# Patient Record
Sex: FEMALE | Race: WHITE | NOT HISPANIC OR LATINO | Employment: UNEMPLOYED | URBAN - METROPOLITAN AREA
[De-identification: names, ages, dates, MRNs, and addresses within clinical notes are randomized per-mention and may not be internally consistent; named-entity substitution may affect disease eponyms.]

---

## 2022-10-11 LAB
CHOLESTEROL (EXTERNAL): 167 MG/DL (ref 130–200)
HDLC SERPL-MCNC: 55 MG/DL
HEP C HIM: NORMAL
HIV 1&2 EXT: NORMAL
LDL CHOLESTEROL CALCULATED (EXTERNAL): 93.6 MG/DL
TRIGLYCERIDES (EXTERNAL): 92 MG/DL (ref 30–150)

## 2022-11-27 ENCOUNTER — TRANSFERRED RECORDS (OUTPATIENT)
Dept: MULTI SPECIALTY CLINIC | Facility: CLINIC | Age: 52
End: 2022-11-27

## 2023-09-29 ENCOUNTER — TRANSFERRED RECORDS (OUTPATIENT)
Dept: HEALTH INFORMATION MANAGEMENT | Facility: CLINIC | Age: 53
End: 2023-09-29

## 2023-10-22 ENCOUNTER — HEALTH MAINTENANCE LETTER (OUTPATIENT)
Age: 53
End: 2023-10-22

## 2023-11-10 ENCOUNTER — OFFICE VISIT (OUTPATIENT)
Dept: FAMILY MEDICINE | Facility: CLINIC | Age: 53
End: 2023-11-10
Payer: COMMERCIAL

## 2023-11-10 VITALS
RESPIRATION RATE: 16 BRPM | HEART RATE: 84 BPM | BODY MASS INDEX: 35.61 KG/M2 | HEIGHT: 63 IN | WEIGHT: 201 LBS | OXYGEN SATURATION: 98 % | TEMPERATURE: 97.6 F | SYSTOLIC BLOOD PRESSURE: 112 MMHG | DIASTOLIC BLOOD PRESSURE: 77 MMHG

## 2023-11-10 DIAGNOSIS — E66.812 CLASS 2 OBESITY WITHOUT SERIOUS COMORBIDITY WITH BODY MASS INDEX (BMI) OF 36.0 TO 36.9 IN ADULT, UNSPECIFIED OBESITY TYPE: ICD-10-CM

## 2023-11-10 DIAGNOSIS — G47.00 INSOMNIA, UNSPECIFIED TYPE: ICD-10-CM

## 2023-11-10 DIAGNOSIS — R19.7 DIARRHEA, UNSPECIFIED TYPE: ICD-10-CM

## 2023-11-10 DIAGNOSIS — E78.2 MIXED HYPERLIPIDEMIA: ICD-10-CM

## 2023-11-10 DIAGNOSIS — Z79.899 MEDICATION MANAGEMENT: ICD-10-CM

## 2023-11-10 DIAGNOSIS — R06.83 SNORING: ICD-10-CM

## 2023-11-10 DIAGNOSIS — Z00.00 ENCOUNTER FOR ROUTINE HISTORY AND PHYSICAL EXAM IN FEMALE: Primary | ICD-10-CM

## 2023-11-10 DIAGNOSIS — Z13.1 DIABETES MELLITUS SCREENING: ICD-10-CM

## 2023-11-10 DIAGNOSIS — Z12.31 VISIT FOR SCREENING MAMMOGRAM: ICD-10-CM

## 2023-11-10 PROBLEM — R73.03 PREDIABETES: Status: ACTIVE | Noted: 2023-11-10

## 2023-11-10 LAB
ALBUMIN SERPL BCG-MCNC: 4.1 G/DL (ref 3.5–5.2)
ALBUMIN UR-MCNC: NEGATIVE MG/DL
ALP SERPL-CCNC: 65 U/L (ref 35–104)
ALT SERPL W P-5'-P-CCNC: 32 U/L (ref 0–50)
ANION GAP SERPL CALCULATED.3IONS-SCNC: 13 MMOL/L (ref 7–15)
APPEARANCE UR: CLEAR
AST SERPL W P-5'-P-CCNC: 29 U/L (ref 0–45)
BILIRUB SERPL-MCNC: 0.3 MG/DL
BILIRUB UR QL STRIP: NEGATIVE
BUN SERPL-MCNC: 11.9 MG/DL (ref 6–20)
CALCIUM SERPL-MCNC: 9.8 MG/DL (ref 8.6–10)
CHLORIDE SERPL-SCNC: 104 MMOL/L (ref 98–107)
CHOLEST SERPL-MCNC: 171 MG/DL
COLOR UR AUTO: YELLOW
CREAT SERPL-MCNC: 0.97 MG/DL (ref 0.51–0.95)
DEPRECATED HCO3 PLAS-SCNC: 23 MMOL/L (ref 22–29)
EGFRCR SERPLBLD CKD-EPI 2021: 69 ML/MIN/1.73M2
ERYTHROCYTE [DISTWIDTH] IN BLOOD BY AUTOMATED COUNT: 13.3 % (ref 10–15)
GLUCOSE SERPL-MCNC: 127 MG/DL (ref 70–99)
GLUCOSE UR STRIP-MCNC: NEGATIVE MG/DL
HBA1C MFR BLD: 5.9 % (ref 0–5.6)
HCT VFR BLD AUTO: 41.5 % (ref 35–47)
HDLC SERPL-MCNC: 54 MG/DL
HGB BLD-MCNC: 13.8 G/DL (ref 11.7–15.7)
HGB UR QL STRIP: NEGATIVE
KETONES UR STRIP-MCNC: NEGATIVE MG/DL
LDLC SERPL CALC-MCNC: 93 MG/DL
LEUKOCYTE ESTERASE UR QL STRIP: NEGATIVE
MCH RBC QN AUTO: 29.4 PG (ref 26.5–33)
MCHC RBC AUTO-ENTMCNC: 33.3 G/DL (ref 31.5–36.5)
MCV RBC AUTO: 89 FL (ref 78–100)
NITRATE UR QL: NEGATIVE
NONHDLC SERPL-MCNC: 117 MG/DL
PH UR STRIP: 5.5 [PH] (ref 5–8)
PLATELET # BLD AUTO: 315 10E3/UL (ref 150–450)
POTASSIUM SERPL-SCNC: 4.1 MMOL/L (ref 3.4–5.3)
PROT SERPL-MCNC: 7 G/DL (ref 6.4–8.3)
RBC # BLD AUTO: 4.69 10E6/UL (ref 3.8–5.2)
SODIUM SERPL-SCNC: 140 MMOL/L (ref 135–145)
SP GR UR STRIP: 1.02 (ref 1–1.03)
TRIGL SERPL-MCNC: 120 MG/DL
TSH SERPL DL<=0.005 MIU/L-ACNC: 4.39 UIU/ML (ref 0.3–4.2)
UROBILINOGEN UR STRIP-ACNC: 0.2 E.U./DL
WBC # BLD AUTO: 8.8 10E3/UL (ref 4–11)

## 2023-11-10 PROCEDURE — 83036 HEMOGLOBIN GLYCOSYLATED A1C: CPT | Performed by: NURSE PRACTITIONER

## 2023-11-10 PROCEDURE — 84443 ASSAY THYROID STIM HORMONE: CPT | Performed by: NURSE PRACTITIONER

## 2023-11-10 PROCEDURE — 99213 OFFICE O/P EST LOW 20 MIN: CPT | Mod: 25 | Performed by: NURSE PRACTITIONER

## 2023-11-10 PROCEDURE — 80053 COMPREHEN METABOLIC PANEL: CPT | Performed by: NURSE PRACTITIONER

## 2023-11-10 PROCEDURE — 80061 LIPID PANEL: CPT | Performed by: NURSE PRACTITIONER

## 2023-11-10 PROCEDURE — 85027 COMPLETE CBC AUTOMATED: CPT | Performed by: NURSE PRACTITIONER

## 2023-11-10 PROCEDURE — 81003 URINALYSIS AUTO W/O SCOPE: CPT | Performed by: NURSE PRACTITIONER

## 2023-11-10 PROCEDURE — 99386 PREV VISIT NEW AGE 40-64: CPT | Performed by: NURSE PRACTITIONER

## 2023-11-10 PROCEDURE — 84439 ASSAY OF FREE THYROXINE: CPT | Performed by: NURSE PRACTITIONER

## 2023-11-10 PROCEDURE — 36415 COLL VENOUS BLD VENIPUNCTURE: CPT | Performed by: NURSE PRACTITIONER

## 2023-11-10 RX ORDER — UBIDECARENONE 100 MG
100 CAPSULE ORAL DAILY
COMMUNITY

## 2023-11-10 RX ORDER — AMOXICILLIN 500 MG
1200 CAPSULE ORAL DAILY
COMMUNITY

## 2023-11-10 RX ORDER — SIMVASTATIN 10 MG
10 TABLET ORAL AT BEDTIME
COMMUNITY
End: 2023-11-10

## 2023-11-10 RX ORDER — MULTIPLE VITAMINS W/ MINERALS TAB 9MG-400MCG
1 TAB ORAL DAILY
COMMUNITY

## 2023-11-10 RX ORDER — ZOLPIDEM TARTRATE 5 MG/1
5 TABLET ORAL
COMMUNITY
End: 2024-09-06

## 2023-11-10 RX ORDER — SIMVASTATIN 10 MG
10 TABLET ORAL AT BEDTIME
Qty: 90 TABLET | Refills: 3 | Status: SHIPPED | OUTPATIENT
Start: 2023-11-10 | End: 2024-09-06

## 2023-11-10 ASSESSMENT — ENCOUNTER SYMPTOMS
CHILLS: 0
JOINT SWELLING: 0
HEARTBURN: 1
FREQUENCY: 0
SHORTNESS OF BREATH: 0
SORE THROAT: 0
HEADACHES: 1
DIZZINESS: 1
ARTHRALGIAS: 0
NAUSEA: 0
HEMATURIA: 0
BREAST MASS: 0
CONSTIPATION: 0
DYSURIA: 0
PARESTHESIAS: 0
EYE PAIN: 0
PALPITATIONS: 0
NERVOUS/ANXIOUS: 0
ABDOMINAL PAIN: 0
WEAKNESS: 0
HEMATOCHEZIA: 0
MYALGIAS: 0
COUGH: 0
DIARRHEA: 1
FEVER: 0

## 2023-11-10 ASSESSMENT — PAIN SCALES - GENERAL: PAINLEVEL: NO PAIN (0)

## 2023-11-10 NOTE — PROGRESS NOTES
Assessment and Plan:    Encounter for routine history and physical exam in female  Recommend consuming a healthy diet and exercising.  She is due for mammogram.  She is up-to-date on colonoscopy and cervical cancer screening.  We will try to obtain results.  - CBC with platelets  - TSH with free T4 reflex  - UA Macroscopic with reflex to Microscopic and Culture  - CBC with platelets  - TSH with free T4 reflex  - UA Macroscopic with reflex to Microscopic and Culture    Visit for screening mammogram  - MA SCREENING DIGITAL BILAT - Future  (s+30)    Diabetes mellitus screening  - Hemoglobin A1c  - Hemoglobin A1c    Mixed hyperlipidemia  We will check lipid cascade.  She continues simvastatin.  Will obtain cardiac calcium score for risk stratification.  - Lipid panel reflex to direct LDL Non-fasting  - simvastatin (ZOCOR) 10 MG tablet  Dispense: 90 tablet; Refill: 3  - CT Coronary Calcium Scan  - Lipid panel reflex to direct LDL Non-fasting    Diarrhea, unspecified type  We will refer to gastroenterology for further evaluation to rule out fructose and lactose intolerance.  - Adult GI  Referral - Consult Only    Snoring  We will refer to sleep center.  - Adult Sleep Eval & Management  Referral    Insomnia, unspecified type  She continue zolpidem as needed.    Medication management  - Comprehensive metabolic panel  - Comprehensive metabolic panel    Class 2 obesity without serious comorbidity with body mass index (BMI) of 36.0 to 36.9 in adult, unspecified obesity type  Recommend consuming a healthy diet and exercising.  This is contributing to hyperlipidemia.      Subjective:     Jesusita is a 53 year old female presenting to the clinic for a female physical.     LMP: endometrial ablation 2009; no period   Hx of abnormal pap smear: none   Last pap smear: 2019   Perform self-breast exams: yes   Vaginal discharge or irritation: none   Sexually active:  to a male for 10 years   Contraception: none    Concerns for STDs: none   Previous pregnancies:none     Patient moved from [place], [place] for her 's job at .  She has lived here for 6 months.  Patient grew up in [place].  Patient takes Zolpidem as needed for insomnia.  She takes this when she is stressed and has difficulty staying asleep.  She started a sleep hygiene program.  Patient is taking simvastatin 10 mg daily for lipid management.  She does not consume healthy diet.  She walks her dog for exercise.  She plans on exercising via virtual reality.  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  She took oral steroids.  Symptoms improved.  She has been asymptomatic since.  She has a history of daily diarrhea for multiple years.  Patient denies abdominal pain or discomfort.  She has not had any blood or mucus in stool.  She had a colonoscopy last year showing polyps.  Next colonoscopy is in 5 years.  Patient has been snoring and would like a sleep evaluation.    Review of systems:  I performed a 10 point review of systems.  All pertinent positives and negatives are noted in the HPI. All others are negative.     Allergies   Allergen Reactions    Sulfa Antibiotics Hives       Current Outpatient Medications   Medication    co-enzyme Q-10 100 MG CAPS capsule    multivitamin w/minerals (MULTI-VITAMIN) tablet    Omega-3 Fatty Acids (FISH OIL) 1200 MG capsule    simvastatin (ZOCOR) 10 MG tablet     No current facility-administered medications for this visit.       Social History     Socioeconomic History    Marital status:      Spouse name: Not on file    Number of children: Not on file    Years of education: Not on file    Highest education level: Not on file   Occupational History    Not on file   Tobacco Use    Smoking status: Former     Types: Cigarettes     Passive exposure: Past    Smokeless tobacco: Never   Vaping Use    Vaping Use: Never used   Substance and Sexual Activity    Alcohol use: Not on file     "Drug use: Not on file    Sexual activity: Not on file   Other Topics Concern    Not on file   Social History Narrative    Not on file     Social Determinants of Health     Financial Resource Strain: Low Risk  (11/10/2023)    Financial Resource Strain     Within the past 12 months, have you or your family members you live with been unable to get utilities (heat, electricity) when it was really needed?: No   Food Insecurity: Low Risk  (11/10/2023)    Food Insecurity     Within the past 12 months, did you worry that your food would run out before you got money to buy more?: No     Within the past 12 months, did the food you bought just not last and you didn t have money to get more?: No   Transportation Needs: Low Risk  (11/10/2023)    Transportation Needs     Within the past 12 months, has lack of transportation kept you from medical appointments, getting your medicines, non-medical meetings or appointments, work, or from getting things that you need?: No   Physical Activity: Not on file   Stress: Not on file   Social Connections: Not on file   Interpersonal Safety: Low Risk  (11/10/2023)    Interpersonal Safety     Do you feel physically and emotionally safe where you currently live?: Yes     Within the past 12 months, have you been hit, slapped, kicked or otherwise physically hurt by someone?: No     Within the past 12 months, have you been humiliated or emotionally abused in other ways by your partner or ex-partner?: No   Housing Stability: Low Risk  (11/10/2023)    Housing Stability     Do you have housing? : Yes     Are you worried about losing your housing?: No       No past medical history on file.    No family history on file.    No past surgical history on file.    Objective:     /77   Pulse 84   Temp 97.6  F (36.4  C)   Resp 16   Ht 1.588 m (5' 2.5\")   Wt 91.2 kg (201 lb)   SpO2 98%   Breastfeeding No   BMI 36.18 kg/m      Patient is alert, no obvious distress.   Skin: Warm, dry.  No rashes or " lesions. Skin turgor rapid return.   HEENT:  Eyes normal.  Ears normal.  Nose patent, mucosa pink.  Oropharynx mucosa pink, no lesions or tonsil enlargement.   Neck:  Supple, without lymphadenopathy, bruits, JVD. Thyroid normal texture and size.    Lungs:  Clear to auscultation.  No wheezing, rales noted.  Respirations even and unlabored.   Heart:  Regular rate and rhythm.  No murmurs.   Breasts:  Normal.  No surrounding adenopathy.   Abdomen: Soft, nontender.  No organomegaly.  Bowel sounds normoactive.  No guarding or masses noted.   :  deferred  Musculoskeletal:  Full ROM of extremities.  Muscle strength equal +5/5.   Neurological:  Cranial nerves 2-12 intact.            Answers submitted by the patient for this visit:  Annual Preventive Visit (Submitted on 11/10/2023)  Chief Complaint: Annual Exam:  Frequency of exercise:: None  Getting at least 3 servings of Calcium per day:: NO  Diet:: Regular (no restrictions)  Taking medications regularly:: Yes  Medication side effects:: None  Bi-annual eye exam:: Yes  Dental care twice a year:: Yes  Sleep apnea or symptoms of sleep apnea:: Excessive snoring  abdominal pain: No  Blood in stool: No  Blood in urine: No  chest pain: No  chills: No  congestion: No  constipation: No  cough: No  diarrhea: Yes  dizziness: Yes  ear pain: No  eye pain: No  nervous/anxious: No  fever: No  frequency: No  genital sores: No  headaches: Yes  hearing loss: No  heartburn: Yes  arthralgias: No  joint swelling: No  peripheral edema: No  mood changes: Yes  myalgias: No  nausea: No  dysuria: No  palpitations: No  Skin sensation changes: No  sore throat: No  urgency: No  rash: No  shortness of breath: No  visual disturbance: No  weakness: No  pelvic pain: No  vaginal bleeding: No  vaginal discharge: No  tenderness: No  breast mass: No  breast discharge: No  Additional concerns today:: Yes

## 2023-11-11 LAB — T4 FREE SERPL-MCNC: 1.13 NG/DL (ref 0.9–1.7)

## 2023-11-12 DIAGNOSIS — R79.89 ELEVATED TSH: Primary | ICD-10-CM

## 2023-11-26 ENCOUNTER — HOSPITAL ENCOUNTER (OUTPATIENT)
Dept: CT IMAGING | Facility: CLINIC | Age: 53
Discharge: HOME | End: 2023-11-26
Attending: NURSE PRACTITIONER | Admitting: NURSE PRACTITIONER
Payer: COMMERCIAL

## 2023-11-26 DIAGNOSIS — E78.2 MIXED HYPERLIPIDEMIA: ICD-10-CM

## 2023-11-26 PROCEDURE — 75571 CT HRT W/O DYE W/CA TEST: CPT | Mod: 26 | Performed by: INTERNAL MEDICINE

## 2023-11-26 PROCEDURE — 75571 CT HRT W/O DYE W/CA TEST: CPT

## 2023-11-27 LAB
CV CALCIUM SCORE AGATSTON LM: 0
CV CALCIUM SCORING AGATSON LAD: 0
CV CALCIUM SCORING AGATSTON CX: 0
CV CALCIUM SCORING AGATSTON RCA: 0
CV CALCIUM SCORING AGATSTON TOTAL: 0

## 2023-12-12 ENCOUNTER — HOSPITAL ENCOUNTER (OUTPATIENT)
Dept: MAMMOGRAPHY | Facility: CLINIC | Age: 53
Discharge: HOME | End: 2023-12-12
Attending: NURSE PRACTITIONER | Admitting: NURSE PRACTITIONER
Payer: COMMERCIAL

## 2023-12-12 DIAGNOSIS — Z12.31 VISIT FOR SCREENING MAMMOGRAM: ICD-10-CM

## 2023-12-12 PROCEDURE — 77067 SCR MAMMO BI INCL CAD: CPT

## 2024-03-29 ENCOUNTER — MYC REFILL (OUTPATIENT)
Dept: FAMILY MEDICINE | Facility: CLINIC | Age: 54
End: 2024-03-29
Payer: COMMERCIAL

## 2024-03-29 DIAGNOSIS — E78.2 MIXED HYPERLIPIDEMIA: ICD-10-CM

## 2024-04-01 RX ORDER — SIMVASTATIN 10 MG
10 TABLET ORAL AT BEDTIME
Qty: 90 TABLET | Refills: 3 | OUTPATIENT
Start: 2024-04-01

## 2024-04-01 ASSESSMENT — SLEEP AND FATIGUE QUESTIONNAIRES
HOW LIKELY ARE YOU TO NOD OFF OR FALL ASLEEP IN A CAR, WHILE STOPPED FOR A FEW MINUTES IN TRAFFIC: WOULD NEVER DOZE
HOW LIKELY ARE YOU TO NOD OFF OR FALL ASLEEP WHILE SITTING AND TALKING TO SOMEONE: WOULD NEVER DOZE
HOW LIKELY ARE YOU TO NOD OFF OR FALL ASLEEP WHILE SITTING AND READING: SLIGHT CHANCE OF DOZING
HOW LIKELY ARE YOU TO NOD OFF OR FALL ASLEEP WHEN YOU ARE A PASSENGER IN A CAR FOR AN HOUR WITHOUT A BREAK: WOULD NEVER DOZE
HOW LIKELY ARE YOU TO NOD OFF OR FALL ASLEEP WHILE LYING DOWN TO REST IN THE AFTERNOON WHEN CIRCUMSTANCES PERMIT: MODERATE CHANCE OF DOZING
HOW LIKELY ARE YOU TO NOD OFF OR FALL ASLEEP WHILE SITTING QUIETLY AFTER LUNCH WITHOUT ALCOHOL: WOULD NEVER DOZE
HOW LIKELY ARE YOU TO NOD OFF OR FALL ASLEEP WHILE WATCHING TV: SLIGHT CHANCE OF DOZING
HOW LIKELY ARE YOU TO NOD OFF OR FALL ASLEEP WHILE SITTING INACTIVE IN A PUBLIC PLACE: WOULD NEVER DOZE

## 2024-04-05 NOTE — PROGRESS NOTES
Virtual Visit Details    Type of service:  Video Visit   Video start time 8:02 AM  Video end time 8:36 AM  Originating Location (pt. Location): Home    Distant Location (provider location):  Off-site  Platform used for Video Visit: [facility]    Outpatient Sleep Medicine Consultation:    Name: Jesusita Henderson MRN# [id number]   Age: 53 year old YOB: 1970     Date of Consultation: April 5, 2024  Consultation is requested by: GLENDY Abreu CNP  [address]  [facility] [address]  [place] [address] Beronica Malhotra  Primary care provider: Beronica Malhotra       Reason for Sleep Consult:     Jesusita Henderson is sent by Beronica Malhotra for a sleep consultation regarding 1. Snoring    Patient s Reason for visit  Jesusita Henderson main reason for visit: Snoring  Patient states problem(s) started: Maybe 3 years ago  Jesusita Henderson's goals for this visit: Find a direction for a solution         Assessment and Plan:     1. Snoring  2. Obesity (BMI 30-39.9)  Patient presents to clinic today for evaluation of loud disruptive snoring.  No witnessed apneic events, gasping/choking arousals, or daytime sleepiness.  She wakes feeling refreshed from sleep.  Discussed with her loud snoring, obesity with BMI 35.25, and age over 50 she is at intermediate risk for underlying MADDY (STOPBANG 3/8). Unknown neck circumference given virtual visit.  Discussed pathophysiology of snoring and MADDY and discussed there are cardiovascular complications associated with sleep apnea.  We discussed the option of pursuing sleep study but patient would like to hold off at this time.  She is mostly concerned with the noise of her snoring and does not believe she has underlying sleep apnea, would prefer to try some interventions for snoring alone first before jumping to study which is reasonable approach.  We discussed avoidance of supine sleep (Sleep Noodle, etc), treating nasal inflammation with nasal steroid (such as Flonase), exercising/strengthening tongue  muscles with game (Soundly naty), avoiding alcohol before bedtime, long term sustained weight loss, and mandibular advancement device (OTC Snore Rx vs sleep dentistry made).  She is likely going to try a combination of the above and I asked her to keep me updated if she sees no improvement or if things worsen and we will likely pursue study at that time for further evaluation.  For now she will follow-up as needed/pending progress with the above.         History of Present Illness:     Jesusita Henderson is a 53-year-old female with obesity, HLD, prediabetes, insomnia on Ambien as needed who presents to clinic today for evaluation of snoring.  Patient and her  slept separately for many years but are now in the same bed again,  has a CPAP so his snoring is no longer an issue but he has to wear earplugs because Jesusita's snoring is so loud.   denies any gasping/choking/witnessed apneic events.  Jesusita does feel well rested on waking and denies any daytime sleepiness.  Has had standing prescription for Ambien since her father , uses very rarely estimates 3 nights in the past year she has needed it.  Will occasionally take an over-the-counter sleep aid, estimates 30 nights in the past year.    SLEEP-WAKE SCHEDULE:     Work/School Days: Patient goes to school/work: Yes   Usually gets into bed at 9pm  Takes patient about 20-30 mins to fall asleep  Has trouble falling asleep 2 nights per week  Wakes up in the middle of the night 1 time.  Wakes up due to Snorting self awake;External stimuli (bed partner, pets, noise, etc);Anxiety  She has trouble falling back asleep Usually any time i wake up I dont fall back asleep times a week.   It usually takes Na to get back to sleep  Patient is usually up at 4am  Uses alarm:      Weekends/Non-work Days/All Other Days:  Usually gets into bed at 10pm  Takes patient about 20-30 minutes to fall asleep  Patient is usually up at 4am  Uses alarm: No    Sleep Need  Patient  estimates she gets 6 sleep on average   Patient thinks she needs about 7.5 sleep    Jesusita Henderson prefers to sleep in this position(s): Side   Patient states they do the following activities in bed: Read;Watch TV    Naps  Patient takes a purposeful nap 0 times a week   She feels better after a nap: Yes  She dozes off unintentionally 0 days per week  Patient has had a driving accident or near-miss due to sleepiness/drowsiness: No  She had a total [place] Sleepiness Scale of 4, with scores of 10 or higher indicating abnormal levels of sleepiness.     SLEEP DISRUPTIONS:    Breathing/Snoring  Patient snores:Yes  Other people complain about her snoring: Yes  Patient has been told she stops breathing in her sleep:No  Gasping/choking arousals:No  She has issues with the following: Heartburn or reflux at night  Denies morning headaches, morning nasal congestion, morning dry mouth, frequent nocturia    Movement:  Patient gets pain, discomfort, with an urge to move:  Yes - occasional back pain, denies RLS symptoms  It happens when she is resting:  No  It happens more at night:  No  It improves with movement:No  Patient has been told she kicks her legs at night:  No     Behaviors in Sleep:  Denies sleepwalking, sleep talking, sleep eating, bruxism, dream enactment, recurring nightmares, night terrors, sleep paralysis, hypnagogic/hypnopompic hallucinations, cataplexy      CAFFEINE AND OTHER SUBSTANCES:    Patient consumes caffeinated beverages per day:  2-3 coffee or tea per day  Last caffeine use is usually: 9a  List of any prescribed or over the counter stimulants that patient takes: Na  List of any prescribed or over the counter sleep medication patient takes: I will take Advil PM or on occassiom Zolpiderm  Patient drinks alcohol to help them sleep: No  Patient drinks alcohol near bedtime: No    Family History:  Patient has a family member been diagnosed with a sleep disorder: Yes  Father had apnea     SCALES:    EPWORTH  SLEEPINESS SCALE         4/1/2024     5:35 PM    [place] Sleepiness Scale ( WARD Moreno  [id number]<br>ESS - [place]/English - Final version - 21 Nov 07 - [facility].)   Sitting and reading Slight chance of dozing   Watching TV Slight chance of dozing   Sitting, inactive in a public place (e.g. a theatre or a meeting) Would never doze   As a passenger in a car for an hour without a break Would never doze   Lying down to rest in the afternoon when circumstances permit Moderate chance of dozing   Sitting and talking to someone Would never doze   Sitting quietly after a lunch without alcohol Would never doze   In a car, while stopped for a few minutes in traffic Would never doze   [place] Score (MC) 4   [place] Score (Sleep) 4       INSOMNIA SEVERITY INDEX (MICHAEL)          4/1/2024     5:27 PM   Insomnia Severity Index (MICHAEL)   Difficulty falling asleep 2   Difficulty staying asleep 3   Problems waking up too early 3   How SATISFIED/DISSATISFIED are you with your CURRENT sleep pattern? 3   How NOTICEABLE to others do you think your sleep problem is in terms of impairing the quality of your life? 1   How WORRIED/DISTRESSED are you about your current sleep problem? 1   To what extent do you consider your sleep problem to INTERFERE with your daily functioning (e.g. daytime fatigue, mood, ability to function at work/daily chores, concentration, memory, mood, etc.) CURRENTLY? 1   MICHAEL Total Score 14       Guidelines for Scoring/Interpretation:  Total score categories:  0-7 = No clinically significant insomnia   8-14 = Subthreshold insomnia   15-21 = Clinical insomnia (moderate severity)  22-28 = Clinical insomnia (severe)  Used via courtesy of www.[place].va.gov with permission from Milton Gongora PhD., [facility]      Allergies:    Allergies   Allergen Reactions    Sulfa Antibiotics Hives       Medications:    Current Outpatient Medications   Medication Sig Dispense Refill    co-enzyme Q-10 100 MG CAPS capsule  Take 100 mg by mouth daily      multivitamin w/minerals (MULTI-VITAMIN) tablet Take 1 tablet by mouth daily      Omega-3 Fatty Acids (FISH OIL) 1200 MG capsule Take 1,200 mg by mouth daily      simvastatin (ZOCOR) 10 MG tablet Take 1 tablet (10 mg) by mouth at bedtime 90 tablet 3    zolpidem (AMBIEN) 5 MG tablet Take 5 mg by mouth nightly as needed for sleep         Problem List:  Patient Active Problem List    Diagnosis Date Noted    Prediabetes 11/10/2023     Priority: Medium        Past Medical/Surgical History:  No past medical history on file.  Past Surgical History:   Procedure Laterality Date    ENDOMETRIAL ABLATION      MAMMOPLASTY REDUCTION Bilateral        Social History:  Social History     Socioeconomic History    Marital status:      Spouse name: Not on file    Number of children: Not on file    Years of education: Not on file    Highest education level: Not on file   Occupational History    Not on file   Tobacco Use    Smoking status: Former     Types: Cigarettes     Passive exposure: Past    Smokeless tobacco: Never    Tobacco comments:     College    Vaping Use    Vaping Use: Never used   Substance and Sexual Activity    Alcohol use: Yes     Comment: one/month    Drug use: Never    Sexual activity: Not on file   Other Topics Concern    Not on file   Social History Narrative    Not on file     Social Determinants of Health     Financial Resource Strain: Low Risk  (11/10/2023)    Financial Resource Strain     Within the past 12 months, have you or your family members you live with been unable to get utilities (heat, electricity) when it was really needed?: No   Food Insecurity: Low Risk  (11/10/2023)    Food Insecurity     Within the past 12 months, did you worry that your food would run out before you got money to buy more?: No     Within the past 12 months, did the food you bought just not last and you didn t have money to get more?: No   Transportation Needs: Low Risk  (11/10/2023)     "Transportation Needs     Within the past 12 months, has lack of transportation kept you from medical appointments, getting your medicines, non-medical meetings or appointments, work, or from getting things that you need?: No   Physical Activity: Not on file   Stress: Not on file   Social Connections: Not on file   Interpersonal Safety: Low Risk  (11/10/2023)    Interpersonal Safety     Do you feel physically and emotionally safe where you currently live?: Yes     Within the past 12 months, have you been hit, slapped, kicked or otherwise physically hurt by someone?: No     Within the past 12 months, have you been humiliated or emotionally abused in other ways by your partner or ex-partner?: No   Housing Stability: Low Risk  (11/10/2023)    Housing Stability     Do you have housing? : Yes     Are you worried about losing your housing?: No       Family History:  Family History   Problem Relation Age of Onset    Diabetes Mother     Hypertension Mother     Macular Degeneration Father     Heart Failure Father        Review of Systems:  Positive for joint pain at night, depressed mood, anxiety    Physical Examination:  Vitals: Ht 1.6 m (5' 3\")   Wt 90.3 kg (199 lb)   BMI 35.25 kg/m    BMI= Body mass index is 35.25 kg/m .  General appearance: Awake, alert, cooperative. Well groomed. Sitting comfortably in chair. In no apparent distress.  HEENT: Head: Normocephalic, atraumatic. Eyes:Conjunctiva clear. Sclera normal. Nose: External appearance without deformity.   Pulmonary:  Able to speak easily in full sentences. No cough or wheeze.   Skin:  No rashes or significant lesions on visible skin.   Neurologic: Alert, oriented x3.   Psychiatric: Mood euthymic. Affect congruent with full range and intensity.         Data: All pertinent previous laboratory data reviewed     Recent Labs   Lab Test 11/10/23  1405      POTASSIUM 4.1   CHLORIDE 104   CO2 23   ANIONGAP 13   *   BUN 11.9   CR 0.97*   GRACIA 9.8       Recent " "Labs   Lab Test 11/10/23  1405   WBC 8.8   RBC 4.69   HGB 13.8   HCT 41.5   MCV 89   MCH 29.4   MCHC 33.3   RDW 13.3          Recent Labs   Lab Test 11/10/23  1405   PROTTOTAL 7.0   ALBUMIN 4.1   BILITOTAL 0.3   ALKPHOS 65   AST 29   ALT 32       TSH (uIU/mL)   Date Value   11/10/2023 4.39 (H)       No results found for: \"UAMP\", \"UBARB\", \"BENZODIAZEUR\", \"UCANN\", \"UCOC\", \"OPIT\", \"UPCP\"    No results found for: \"IRONSAT\", \"YH00623\", \"MONTY\"    No results found for: \"PH\", \"PHARTERIAL\", \"PO2\", \"FX1SZLWSCMT\", \"SAT\", \"PCO2\", \"HCO3\", \"BASEEXCESS\", \"PREM\", \"BEB\"    @LABRCNTIPR(phv:4,pco2v:4,po2v:4,hco3v:4,riddhi:4,o2per:4)@    Echocardiology: No results found for this or any previous visit (from the past 4320 hour(s)).    Chest x-ray: No results found for this or any previous visit from the past 365 days.      Chest CT: No results found for this or any previous visit from the past 365 days.      PFT: Most Recent [facility] Pulmonary Function Testing    No results found for: \"20001\"  No results found for: \"20002\"  No results found for: \"20003\"  No results found for: \"20015\"  No results found for: \"20016\"  No results found for: \"20027\"  No results found for: \"20028\"  No results found for: \"20029\"  No results found for: \"20079\"  No results found for: \"20080\"  No results found for: \"20081\"  No results found for: \"20335\"  No results found for: \"20105\"  No results found for: \"20053\"  No results found for: \"20054\"  No results found for: \"20055\"      Janette Mckeon PA-C 4/5/2024     [facility]  [address], [place] [address]     [facility]  [address], [place] [address]    Chart documentation was completed, in part, with [employer] voice-recognition software. Even though reviewed, some grammatical, spelling, and word errors may remain.    48 minutes spent on day of encounter reviewing medical records, history and physical examination, review of " previous testing and interpretation, documentation and further activities as noted above

## 2024-04-08 ENCOUNTER — VIRTUAL VISIT (OUTPATIENT)
Dept: SLEEP MEDICINE | Facility: CLINIC | Age: 54
End: 2024-04-08
Attending: NURSE PRACTITIONER
Payer: COMMERCIAL

## 2024-04-08 VITALS — HEIGHT: 63 IN | WEIGHT: 199 LBS | BODY MASS INDEX: 35.26 KG/M2

## 2024-04-08 DIAGNOSIS — E66.9 OBESITY (BMI 30-39.9): ICD-10-CM

## 2024-04-08 DIAGNOSIS — R06.83 SNORING: Primary | ICD-10-CM

## 2024-04-08 PROCEDURE — 99204 OFFICE O/P NEW MOD 45 MIN: CPT | Mod: 95 | Performed by: PHYSICIAN ASSISTANT

## 2024-04-08 ASSESSMENT — PAIN SCALES - GENERAL: PAINLEVEL: NO PAIN (0)

## 2024-04-08 NOTE — NURSING NOTE
Is the patient currently in the state of [place]? YES    Visit mode:VIDEO    If the visit is dropped, the patient can be reconnected by: VIDEO VISIT: Send to e-mail at: [email or web address]    Will anyone else be joining the visit? NO  (If patient encounters technical issues they should call [phone]    How would you like to obtain your AVS? MyChart    Are changes needed to the allergy or medication list? Pt stated no changes to allergies and Pt stated no med changes    Has patient had flu shot for current/most recent flu season? If so, when? Yes: 11/05/2023      Reason for visit: Consult    Sandra RUDOLPH

## 2024-04-23 ENCOUNTER — E-VISIT (OUTPATIENT)
Dept: FAMILY MEDICINE | Facility: CLINIC | Age: 54
End: 2024-04-23
Payer: COMMERCIAL

## 2024-04-23 DIAGNOSIS — J30.9 ALLERGIC RHINITIS, UNSPECIFIED SEASONALITY, UNSPECIFIED TRIGGER: Primary | ICD-10-CM

## 2024-04-23 PROCEDURE — 99421 OL DIG E/M SVC 5-10 MIN: CPT | Performed by: NURSE PRACTITIONER

## 2024-04-23 RX ORDER — CETIRIZINE HYDROCHLORIDE 10 MG/1
10 TABLET ORAL DAILY
Qty: 30 TABLET | Refills: 3 | Status: SHIPPED | OUTPATIENT
Start: 2024-04-23

## 2024-04-23 RX ORDER — FLUTICASONE PROPIONATE 50 MCG
2 SPRAY, SUSPENSION (ML) NASAL DAILY
Qty: 16 G | Refills: 3 | Status: SHIPPED | OUTPATIENT
Start: 2024-04-23

## 2024-04-23 NOTE — PATIENT INSTRUCTIONS
Thank you for choosing us for your care. I have placed an order for a prescription so that you can start treatment. View your full visit summary for details by clicking on the link below. Your pharmacist will able to address any questions you may have about the medication.     Please schedule an appointment to discuss weight loss medications.      If you're not feeling better within 5-7 days, please schedule an appointment.  You can schedule an appointment right here in [facility], or call [phone]  If the visit is for the same symptoms as your eVisit, we'll refund the cost of your eVisit if seen within seven days.

## 2024-08-15 ENCOUNTER — APPOINTMENT (OUTPATIENT)
Dept: URBAN - METROPOLITAN AREA CLINIC 260 | Age: 54
Setting detail: DERMATOLOGY
End: 2024-08-15

## 2024-08-15 VITALS — WEIGHT: 190 LBS | HEIGHT: 55 IN

## 2024-08-15 DIAGNOSIS — B36.0 PITYRIASIS VERSICOLOR: ICD-10-CM

## 2024-08-15 DIAGNOSIS — L12.1 CICATRICIAL PEMPHIGOID: ICD-10-CM

## 2024-08-15 DIAGNOSIS — D22 MELANOCYTIC NEVI: ICD-10-CM

## 2024-08-15 PROBLEM — D48.5 NEOPLASM OF UNCERTAIN BEHAVIOR OF SKIN: Status: ACTIVE | Noted: 2024-08-15

## 2024-08-15 PROCEDURE — OTHER BIOPSY BY SHAVE METHOD: OTHER

## 2024-08-15 PROCEDURE — OTHER PRESCRIPTION: OTHER

## 2024-08-15 PROCEDURE — 11102 TANGNTL BX SKIN SINGLE LES: CPT

## 2024-08-15 PROCEDURE — OTHER ADDITIONAL NOTES: OTHER

## 2024-08-15 PROCEDURE — 99203 OFFICE O/P NEW LOW 30 MIN: CPT | Mod: 25

## 2024-08-15 PROCEDURE — OTHER PRESCRIPTION MEDICATION MANAGEMENT: OTHER

## 2024-08-15 PROCEDURE — OTHER COUNSELING: OTHER

## 2024-08-15 PROCEDURE — OTHER PHOTO-DOCUMENTATION: OTHER

## 2024-08-15 PROCEDURE — OTHER MIPS QUALITY: OTHER

## 2024-08-15 RX ORDER — TRIAMCINOLONE ACETONIDE 1 MG/G
PASTE DENTAL
Qty: 5 | Refills: 6 | Status: ERX | COMMUNITY
Start: 2024-08-15

## 2024-08-15 RX ORDER — KETOCONAZOLE 20 MG/G
2% CREAM TOPICAL
Qty: 60 | Refills: 6 | Status: ERX | COMMUNITY
Start: 2024-08-15

## 2024-08-15 ASSESSMENT — LOCATION ZONE DERM
LOCATION ZONE: ARM
LOCATION ZONE: FACE
LOCATION ZONE: MUCOUS_MEMBRANE

## 2024-08-15 ASSESSMENT — LOCATION SIMPLE DESCRIPTION DERM
LOCATION SIMPLE: LEFT SUPERIOR GINGIVAE
LOCATION SIMPLE: RIGHT BUCCAL CHEEK
LOCATION SIMPLE: RIGHT FOREARM
LOCATION SIMPLE: LEFT FOREARM

## 2024-08-15 ASSESSMENT — LOCATION DETAILED DESCRIPTION DERM
LOCATION DETAILED: LEFT SUPERIOR GINGIVAE
LOCATION DETAILED: RIGHT INFERIOR MEDIAL BUCCAL CHEEK
LOCATION DETAILED: RIGHT VENTRAL PROXIMAL FOREARM
LOCATION DETAILED: LEFT VENTRAL PROXIMAL FOREARM

## 2024-08-15 NOTE — PROCEDURE: ADDITIONAL NOTES
Detail Level: Simple
Additional Notes: No active flares noted upon examination today. Patient states her last flare was 5 years ago, but she requested a topical steroid to be sent to her pharmacy in case she develops a flare.
Render Risk Assessment In Note?: no

## 2024-08-15 NOTE — PROCEDURE: BIOPSY BY SHAVE METHOD
Detail Level: Detailed
Depth Of Biopsy: dermis
Was A Bandage Applied: Yes
Size Of Lesion In Cm: 0
Biopsy Type: H and E
Biopsy Method: double edge Personna blade
Anesthesia Type: 1% lidocaine with epinephrine and a 1:10 solution of 8.4% sodium bicarbonate
Anesthesia Volume In Cc: 0.5
Hemostasis: Drysol
Wound Care: Petrolatum
Dressing: bandage
Destruction After The Procedure: No
Type Of Destruction Used: Curettage
Curettage Text: The wound bed was treated with curettage after the biopsy was performed.
Cryotherapy Text: The wound bed was treated with cryotherapy after the biopsy was performed.
Electrodesiccation Text: The wound bed was treated with electrodesiccation after the biopsy was performed.
Electrodesiccation And Curettage Text: The wound bed was treated with electrodesiccation and curettage after the biopsy was performed.
Silver Nitrate Text: The wound bed was treated with silver nitrate after the biopsy was performed.
Lab: [id number]
Consent: Written consent was obtained and risks were reviewed including but not limited to scarring, infection, bleeding, scabbing, incomplete removal, nerve damage and allergy to anesthesia.
Post-Care Instructions: I reviewed with the patient in detail post-care instructions. Patient is to keep the biopsy site dry overnight, and then apply bacitracin twice daily until healed. Patient may apply hydrogen peroxide soaks to remove any crusting.
Notification Instructions: Patient will be notified of biopsy results. However, patient instructed to call the office if not contacted within 2 weeks.
Billing Type: Third-Party Bill
Information: Selecting Yes will display possible errors in your note based on the variables you have selected. This validation is only offered as a suggestion for you. PLEASE NOTE THAT THE VALIDATION TEXT WILL BE REMOVED WHEN YOU FINALIZE YOUR NOTE. IF YOU WANT TO FAX A PRELIMINARY NOTE YOU WILL NEED TO TOGGLE THIS TO 'NO' IF YOU DO NOT WANT IT IN YOUR FAXED NOTE.

## 2024-08-15 NOTE — PROCEDURE: PRESCRIPTION MEDICATION MANAGEMENT
Render In Strict Bullet Format?: No
Detail Level: Simple
Initiate Treatment: Ketoconazole 2 % topical cream to affected areas twice daily

## 2024-08-15 NOTE — HPI: RASH
How Severe Is Your Rash?: mild
Is This A New Presentation, Or A Follow-Up?: Rash
Additional History: Patient is experiencing rash on both arms that’s itchy and spreading.
Additional History: Patient has mucous membrane pemphigoid that is stable but would like it checked today.

## 2024-09-05 PROBLEM — B35.3 TINEA PEDIS: Status: ACTIVE | Noted: 2022-12-26

## 2024-09-05 PROBLEM — L85.3 DRY SKIN DERMATITIS: Status: ACTIVE | Noted: 2022-12-20

## 2024-09-05 RX ORDER — CLOTRIMAZOLE AND BETAMETHASONE DIPROPIONATE 10; .64 MG/G; MG/G
CREAM TOPICAL
COMMUNITY

## 2024-09-05 RX ORDER — TERBINAFINE HYDROCHLORIDE 250 MG/1
1 TABLET ORAL DAILY
COMMUNITY
End: 2024-09-06

## 2024-09-06 ENCOUNTER — OFFICE VISIT (OUTPATIENT)
Dept: FAMILY MEDICINE | Facility: CLINIC | Age: 54
End: 2024-09-06
Attending: NURSE PRACTITIONER
Payer: COMMERCIAL

## 2024-09-06 VITALS
HEIGHT: 62 IN | WEIGHT: 206 LBS | OXYGEN SATURATION: 95 % | BODY MASS INDEX: 37.91 KG/M2 | RESPIRATION RATE: 16 BRPM | SYSTOLIC BLOOD PRESSURE: 135 MMHG | HEART RATE: 71 BPM | DIASTOLIC BLOOD PRESSURE: 89 MMHG | TEMPERATURE: 97.6 F

## 2024-09-06 DIAGNOSIS — R79.89 ELEVATED TSH: ICD-10-CM

## 2024-09-06 DIAGNOSIS — E78.2 MIXED HYPERLIPIDEMIA: ICD-10-CM

## 2024-09-06 DIAGNOSIS — E66.01 CLASS 2 SEVERE OBESITY WITH SERIOUS COMORBIDITY AND BODY MASS INDEX (BMI) OF 37.0 TO 37.9 IN ADULT, UNSPECIFIED OBESITY TYPE (H): Primary | ICD-10-CM

## 2024-09-06 DIAGNOSIS — Z79.899 MEDICATION MANAGEMENT: ICD-10-CM

## 2024-09-06 DIAGNOSIS — R73.03 PREDIABETES: ICD-10-CM

## 2024-09-06 DIAGNOSIS — G47.00 INSOMNIA, UNSPECIFIED TYPE: ICD-10-CM

## 2024-09-06 DIAGNOSIS — E66.812 CLASS 2 SEVERE OBESITY WITH SERIOUS COMORBIDITY AND BODY MASS INDEX (BMI) OF 37.0 TO 37.9 IN ADULT, UNSPECIFIED OBESITY TYPE (H): Primary | ICD-10-CM

## 2024-09-06 LAB
ALBUMIN SERPL BCG-MCNC: 4.1 G/DL (ref 3.5–5.2)
ALP SERPL-CCNC: 70 U/L (ref 40–150)
ALT SERPL W P-5'-P-CCNC: 27 U/L (ref 0–50)
ANION GAP SERPL CALCULATED.3IONS-SCNC: 11 MMOL/L (ref 7–15)
AST SERPL W P-5'-P-CCNC: 29 U/L (ref 0–45)
BILIRUB SERPL-MCNC: 0.3 MG/DL
BUN SERPL-MCNC: 10.8 MG/DL (ref 6–20)
CALCIUM SERPL-MCNC: 9.4 MG/DL (ref 8.8–10.4)
CHLORIDE SERPL-SCNC: 104 MMOL/L (ref 98–107)
CREAT SERPL-MCNC: 0.9 MG/DL (ref 0.51–0.95)
EGFRCR SERPLBLD CKD-EPI 2021: 75 ML/MIN/1.73M2
GLUCOSE SERPL-MCNC: 98 MG/DL (ref 70–99)
HBA1C MFR BLD: 6 % (ref 0–5.6)
HCO3 SERPL-SCNC: 23 MMOL/L (ref 22–29)
POTASSIUM SERPL-SCNC: 4 MMOL/L (ref 3.4–5.3)
PROT SERPL-MCNC: 7 G/DL (ref 6.4–8.3)
SODIUM SERPL-SCNC: 138 MMOL/L (ref 135–145)
T4 FREE SERPL-MCNC: 1.14 NG/DL (ref 0.9–1.7)
TSH SERPL DL<=0.005 MIU/L-ACNC: 4.31 UIU/ML (ref 0.3–4.2)

## 2024-09-06 PROCEDURE — 84443 ASSAY THYROID STIM HORMONE: CPT | Performed by: NURSE PRACTITIONER

## 2024-09-06 PROCEDURE — 84439 ASSAY OF FREE THYROXINE: CPT | Performed by: NURSE PRACTITIONER

## 2024-09-06 PROCEDURE — 83036 HEMOGLOBIN GLYCOSYLATED A1C: CPT | Performed by: NURSE PRACTITIONER

## 2024-09-06 PROCEDURE — 36415 COLL VENOUS BLD VENIPUNCTURE: CPT | Performed by: NURSE PRACTITIONER

## 2024-09-06 PROCEDURE — 80053 COMPREHEN METABOLIC PANEL: CPT | Performed by: NURSE PRACTITIONER

## 2024-09-06 PROCEDURE — 99214 OFFICE O/P EST MOD 30 MIN: CPT | Performed by: NURSE PRACTITIONER

## 2024-09-06 RX ORDER — ZOLPIDEM TARTRATE 5 MG/1
5 TABLET ORAL
Qty: 30 TABLET | Refills: 0 | Status: SHIPPED | OUTPATIENT
Start: 2024-09-06

## 2024-09-06 RX ORDER — TRIAMCINOLONE ACETONIDE 0.1 %
PASTE (GRAM) DENTAL
COMMUNITY
Start: 2024-08-15 | End: 2024-09-06

## 2024-09-06 RX ORDER — KETOCONAZOLE 20 MG/G
CREAM TOPICAL
COMMUNITY
Start: 2024-08-15

## 2024-09-06 RX ORDER — SIMVASTATIN 10 MG
10 TABLET ORAL AT BEDTIME
Qty: 90 TABLET | Refills: 2 | Status: SHIPPED | OUTPATIENT
Start: 2024-09-06

## 2024-09-06 ASSESSMENT — PAIN SCALES - GENERAL: PAINLEVEL: NO PAIN (1)
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
## 2024-12-17 ENCOUNTER — OFFICE VISIT (OUTPATIENT)
Dept: NEUROLOGY | Facility: CLINIC | Age: 54
End: 2024-12-17
Payer: COMMERCIAL

## 2024-12-17 DIAGNOSIS — F43.23 ADJUSTMENT DISORDER WITH MIXED ANXIETY AND DEPRESSED MOOD: Primary | ICD-10-CM

## 2024-12-17 PROCEDURE — 90791 PSYCH DIAGNOSTIC EVALUATION: CPT | Performed by: PSYCHOLOGIST

## 2024-12-25 SDOH — HEALTH STABILITY: PHYSICAL HEALTH: ON AVERAGE, HOW MANY DAYS PER WEEK DO YOU ENGAGE IN MODERATE TO STRENUOUS EXERCISE (LIKE A BRISK WALK)?: 7 DAYS

## 2024-12-25 SDOH — HEALTH STABILITY: PHYSICAL HEALTH: ON AVERAGE, HOW MANY MINUTES DO YOU ENGAGE IN EXERCISE AT THIS LEVEL?: 10 MIN

## 2024-12-25 ASSESSMENT — SOCIAL DETERMINANTS OF HEALTH (SDOH): HOW OFTEN DO YOU GET TOGETHER WITH FRIENDS OR RELATIVES?: NEVER

## 2024-12-26 ENCOUNTER — OFFICE VISIT (OUTPATIENT)
Dept: FAMILY MEDICINE | Facility: CLINIC | Age: 54
End: 2024-12-26
Payer: COMMERCIAL

## 2024-12-26 VITALS
DIASTOLIC BLOOD PRESSURE: 77 MMHG | RESPIRATION RATE: 20 BRPM | TEMPERATURE: 96.5 F | BODY MASS INDEX: 37.73 KG/M2 | OXYGEN SATURATION: 98 % | HEART RATE: 74 BPM | SYSTOLIC BLOOD PRESSURE: 111 MMHG | WEIGHT: 205 LBS | HEIGHT: 62 IN

## 2024-12-26 DIAGNOSIS — E66.812 CLASS 2 SEVERE OBESITY WITH SERIOUS COMORBIDITY AND BODY MASS INDEX (BMI) OF 37.0 TO 37.9 IN ADULT, UNSPECIFIED OBESITY TYPE (H): ICD-10-CM

## 2024-12-26 DIAGNOSIS — G47.00 INSOMNIA, UNSPECIFIED TYPE: ICD-10-CM

## 2024-12-26 DIAGNOSIS — E78.2 MIXED HYPERLIPIDEMIA: ICD-10-CM

## 2024-12-26 DIAGNOSIS — R31.21 ASYMPTOMATIC MICROSCOPIC HEMATURIA: Primary | ICD-10-CM

## 2024-12-26 DIAGNOSIS — Z12.31 ENCOUNTER FOR SCREENING MAMMOGRAM FOR BREAST CANCER: ICD-10-CM

## 2024-12-26 DIAGNOSIS — R73.03 PREDIABETES: ICD-10-CM

## 2024-12-26 DIAGNOSIS — Z12.4 CERVICAL CANCER SCREENING: ICD-10-CM

## 2024-12-26 DIAGNOSIS — E66.01 CLASS 2 SEVERE OBESITY WITH SERIOUS COMORBIDITY AND BODY MASS INDEX (BMI) OF 37.0 TO 37.9 IN ADULT, UNSPECIFIED OBESITY TYPE (H): ICD-10-CM

## 2024-12-26 DIAGNOSIS — R31.21 ASYMPTOMATIC MICROSCOPIC HEMATURIA: ICD-10-CM

## 2024-12-26 DIAGNOSIS — Z00.00 ENCOUNTER FOR ROUTINE HISTORY AND PHYSICAL EXAM IN FEMALE: Primary | ICD-10-CM

## 2024-12-26 DIAGNOSIS — E03.8 SUBCLINICAL HYPOTHYROIDISM: ICD-10-CM

## 2024-12-26 LAB
ALBUMIN UR-MCNC: NEGATIVE MG/DL
APPEARANCE UR: CLEAR
BACTERIA #/AREA URNS HPF: ABNORMAL /HPF
BILIRUB UR QL STRIP: NEGATIVE
CHOLEST SERPL-MCNC: 135 MG/DL
COLOR UR AUTO: YELLOW
ERYTHROCYTE [DISTWIDTH] IN BLOOD BY AUTOMATED COUNT: 13.2 % (ref 10–15)
EST. AVERAGE GLUCOSE BLD GHB EST-MCNC: 134 MG/DL
FASTING STATUS PATIENT QL REPORTED: ABNORMAL
GLUCOSE UR STRIP-MCNC: NEGATIVE MG/DL
HBA1C MFR BLD: 6.3 % (ref 0–5.6)
HCT VFR BLD AUTO: 43.7 % (ref 35–47)
HDLC SERPL-MCNC: 47 MG/DL
HGB BLD-MCNC: 14.8 G/DL (ref 11.7–15.7)
HGB UR QL STRIP: ABNORMAL
KETONES UR STRIP-MCNC: NEGATIVE MG/DL
LDLC SERPL CALC-MCNC: 70 MG/DL
LEUKOCYTE ESTERASE UR QL STRIP: NEGATIVE
MCH RBC QN AUTO: 29.4 PG (ref 26.5–33)
MCHC RBC AUTO-ENTMCNC: 33.9 G/DL (ref 31.5–36.5)
MCV RBC AUTO: 87 FL (ref 78–100)
MUCOUS THREADS #/AREA URNS LPF: PRESENT /LPF
NITRATE UR QL: NEGATIVE
NONHDLC SERPL-MCNC: 88 MG/DL
PH UR STRIP: 5.5 [PH] (ref 5–8)
PLATELET # BLD AUTO: 291 10E3/UL (ref 150–450)
RBC # BLD AUTO: 5.03 10E6/UL (ref 3.8–5.2)
RBC #/AREA URNS AUTO: ABNORMAL /HPF
SP GR UR STRIP: 1.02 (ref 1–1.03)
SQUAMOUS #/AREA URNS AUTO: ABNORMAL /LPF
T4 FREE SERPL-MCNC: 1.14 NG/DL (ref 0.9–1.7)
TRIGL SERPL-MCNC: 89 MG/DL
TSH SERPL DL<=0.005 MIU/L-ACNC: 5.49 UIU/ML (ref 0.3–4.2)
UROBILINOGEN UR STRIP-ACNC: 0.2 E.U./DL
WBC # BLD AUTO: 7.7 10E3/UL (ref 4–11)
WBC #/AREA URNS AUTO: ABNORMAL /HPF
YEAST #/AREA URNS HPF: ABNORMAL /HPF

## 2024-12-26 RX ORDER — SIMVASTATIN 10 MG
10 TABLET ORAL AT BEDTIME
Qty: 90 TABLET | Refills: 3 | Status: SHIPPED | OUTPATIENT
Start: 2024-12-26

## 2024-12-26 ASSESSMENT — PAIN SCALES - GENERAL: PAINLEVEL_OUTOF10: NO PAIN (0)

## 2024-12-26 NOTE — PROGRESS NOTES
Assessment and Plan:    Encounter for routine history and physical exam in female  Recommend consuming a healthy diet and exercising.  She declines vaccines.  Pap smear obtained.  Patient quit smoking over 30 years ago while she was in college.  Mammogram ordered.  She is up-to-date on colorectal cancer screening.  - CBC with platelets  - UA Macroscopic with reflex to Microscopic and Culture  - CBC with platelets  - UA Macroscopic with reflex to Microscopic and Culture    Cervical cancer screening  - HPV and Gynecologic Cytology Panel - Recommended Age 30 - 65 Years    Encounter for screening mammogram for breast cancer  - MA Screen Bilateral w/Sathish    Mixed hyperlipidemia  Will check lipid cascade.  She continues simvastatin.  - Lipid panel reflex to direct LDL Non-fasting  - Lipid panel reflex to direct LDL Non-fasting    Prediabetes  Will check A1c.  - Hemoglobin A1c    Subclinical hypothyroidism  Will check thyroid cascade.  She is not currently taking medication.  - TSH with free T4 reflex  - TSH with free T4 reflex    Class 2 severe obesity with serious comorbidity and body mass index (BMI) of 37.0 to 37.9 in adult, unspecified obesity type (H)  Recommend eating healthy and increasing activity level.  This is contributing to prediabetes and hyperlipidemia.    Insomnia, unspecified type  She continues Zolpidem as needed.       Subjective:     Jesusita is a 54 year old female presenting to the clinic for a female physical.     LMP: endometrial ablation 2009; no period   Hx of abnormal pap smear: none   Last pap smear: 2019   Perform self-breast exams: yes   Vaginal discharge or irritation: none   Sexually active:  to a male for 12 years   Contraception: none   Concerns for STDs: none   Previous pregnancies:none   Patient moved from [place], [place] for her 's job at .    Patient grew up in [place].      Patient takes Zolpidem as needed for insomnia.  She takes this when she is stressed and has  difficulty staying asleep. Patient is taking simvastatin 10 mg daily for lipid management.  Last cholesterol check was on 11/10/2023 with a total cholesterol 171, triglycerides 120, HDL 54, LDL 93.  Patient was seen previously for weight management.  She was prescribed Wegovy.  She has not started the medication.  She has subclinical hypothyroidism.  Last TSH was 4.3 1 on 9/6/2024.  She has prediabetes with an A1c of 6% on 9/6/2024.      Review of systems:  I performed a 10 point review of systems.  All pertinent positives and negatives are noted in the HPI. All others are negative.     Allergies   Allergen Reactions    Sulfa Antibiotics Hives       Current Outpatient Medications   Medication Sig Dispense Refill    co-enzyme Q-10 100 MG CAPS capsule Take 100 mg by mouth daily      multivitamin w/minerals (MULTI-VITAMIN) tablet Take 1 tablet by mouth daily      Omega-3 Fatty Acids (FISH OIL) 1200 MG capsule Take 1,200 mg by mouth daily      simvastatin (ZOCOR) 10 MG tablet Take 1 tablet (10 mg) by mouth at bedtime. 90 tablet 2    zolpidem (AMBIEN) 5 MG tablet Take 1 tablet (5 mg) by mouth nightly as needed for sleep. 30 tablet 0     No current facility-administered medications for this visit.       Social History     Socioeconomic History    Marital status:      Spouse name: Not on file    Number of children: Not on file    Years of education: Not on file    Highest education level: Not on file   Occupational History    Not on file   Tobacco Use    Smoking status: Former     Types: Cigarettes     Passive exposure: Past    Smokeless tobacco: Never    Tobacco comments:     College    Vaping Use    Vaping status: Never Used   Substance and Sexual Activity    Alcohol use: Yes     Comment: one/month    Drug use: Never    Sexual activity: Not on file   Other Topics Concern    Not on file   Social History Narrative    Not on file     Social Drivers of Health     Financial Resource Strain: Low Risk  (12/25/2024)     Financial Resource Strain     Within the past 12 months, have you or your family members you live with been unable to get utilities (heat, electricity) when it was really needed?: No   Food Insecurity: Low Risk  (12/25/2024)    Food Insecurity     Within the past 12 months, did you worry that your food would run out before you got money to buy more?: No     Within the past 12 months, did the food you bought just not last and you didn t have money to get more?: No   Transportation Needs: Low Risk  (12/25/2024)    Transportation Needs     Within the past 12 months, has lack of transportation kept you from medical appointments, getting your medicines, non-medical meetings or appointments, work, or from getting things that you need?: No   Physical Activity: Insufficiently Active (12/25/2024)    Exercise Vital Sign     Days of Exercise per Week: 7 days     Minutes of Exercise per Session: 10 min   Stress: Stress Concern Present (12/25/2024)    [employer] [place] [employer] - Occupational Stress Questionnaire     Feeling of Stress : Very much   Social Connections: Unknown (12/25/2024)    Social Connection and Isolation Panel [NHANES]     Frequency of Communication with Friends and Family: Not on file     Frequency of Social Gatherings with Friends and Family: Never     Attends Evangelical Services: Not on file     Active Member of Clubs or Organizations: Not on file     Attends Club or Organization Meetings: Not on file     Marital Status: Not on file   Interpersonal Safety: Low Risk  (12/26/2024)    Interpersonal Safety     Do you feel physically and emotionally safe where you currently live?: Yes     Within the past 12 months, have you been hit, slapped, kicked or otherwise physically hurt by someone?: No     Within the past 12 months, have you been humiliated or emotionally abused in other ways by your partner or ex-partner?: No   Housing Stability: Low Risk  (12/25/2024)    Housing Stability     Do you have  "housing? : Yes     Are you worried about losing your housing?: No       History reviewed. No pertinent past medical history.    Family History   Problem Relation Age of Onset    Diabetes Mother     Hypertension Mother     Sleep Apnea Father     Macular Degeneration Father     Heart Failure Father        Past Surgical History:   Procedure Laterality Date    ENDOMETRIAL ABLATION      MAMMOPLASTY REDUCTION Bilateral        Objective:     /77   Pulse 74   Temp (!) 96.5  F (35.8  C)   Resp 20   Ht 1.575 m (5' 2\")   Wt 93 kg (205 lb)   SpO2 98%   Breastfeeding No   BMI 37.49 kg/m      Patient is alert, no obvious distress.   Skin: Warm, dry.  No rashes or lesions. Skin turgor rapid return.   HEENT:  Eyes normal.  Ears normal.  Nose patent, mucosa pink.  Oropharynx mucosa pink, no lesions or tonsil enlargement.   Neck:  Supple, without lymphadenopathy, bruits, JVD. Thyroid normal texture and size.    Lungs:  Clear to auscultation.  No wheezing, rales noted.  Respirations even and unlabored.   Heart:  Regular rate and rhythm.  No murmurs.   Breasts:  Normal.  No surrounding adenopathy.   Abdomen: Soft, nontender.  No organomegaly.  Bowel sounds normoactive.  No guarding or masses noted.   :  External genitalia normal.  Normal vaginal mucosa.  Cervix no lesions or cervical motion tenderness.   Musculoskeletal:  Full ROM of extremities.  Muscle strength equal +5/5.   Neurological:  Cranial nerves 2-12 intact.              "

## 2024-12-27 ENCOUNTER — TELEPHONE (OUTPATIENT)
Dept: FAMILY MEDICINE | Facility: CLINIC | Age: 54
End: 2024-12-27
Payer: COMMERCIAL

## 2024-12-27 NOTE — TELEPHONE ENCOUNTER
"Called and spoke with patient and relayed results and provider response and recommendations. Patient endorses understanding. Provided patient with phone number for [facility] in [place].    Patient would like to ask the provider to comment further on the \"abnormal\" values on the UA microscopic. Informed patient that the culture is triggered by the UA, and the culture did not show an infection. Patient endorses understanding, but would still like the provider to comment further on these values. Patient would like the provider to be aware that she was sick prior to urine sample, and thinks in general she was dehydrated, and is wondering if any abnormals could be related to this. Patient would also like to ask the provider if any amount of blood is ever \"normal\" in the urine.    Routing to provider to review and advise.    Clau Toussaint RN  [facility]      ----- Message from Beronica Malhotra sent at 12/27/2024  4:11 PM CST -----  Please notify the patient that the urine culture is normal, so a urinary tract infection is not present.  The urine is showing blood, though. This can be a sign of bladder irritation, kidney stones, and bladder cancer.  I placed a referral to [facility] in [place] for further evaluation.  Please provide the phone number.     "

## 2024-12-30 DIAGNOSIS — R31.21 ASYMPTOMATIC MICROSCOPIC HEMATURIA: Primary | ICD-10-CM

## 2024-12-31 LAB
BKR AP ASSOCIATED HPV REPORT: NORMAL
BKR LAB AP GYN ADEQUACY: NORMAL
BKR LAB AP GYN INTERPRETATION: NORMAL
BKR LAB AP PREVIOUS ABNORMAL: NORMAL
PATH REPORT.COMMENTS IMP SPEC: NORMAL
PATH REPORT.COMMENTS IMP SPEC: NORMAL
PATH REPORT.RELEVANT HX SPEC: NORMAL

## 2025-01-02 ENCOUNTER — LAB (OUTPATIENT)
Dept: LAB | Facility: CLINIC | Age: 55
End: 2025-01-02
Payer: COMMERCIAL

## 2025-01-02 DIAGNOSIS — R31.21 ASYMPTOMATIC MICROSCOPIC HEMATURIA: ICD-10-CM

## 2025-01-02 LAB
ALBUMIN UR-MCNC: NEGATIVE MG/DL
APPEARANCE UR: CLEAR
BACTERIA #/AREA URNS HPF: ABNORMAL /HPF
BILIRUB UR QL STRIP: NEGATIVE
COLOR UR AUTO: YELLOW
GLUCOSE UR STRIP-MCNC: NEGATIVE MG/DL
HGB UR QL STRIP: ABNORMAL
KETONES UR STRIP-MCNC: NEGATIVE MG/DL
LEUKOCYTE ESTERASE UR QL STRIP: NEGATIVE
NITRATE UR QL: NEGATIVE
PH UR STRIP: 5.5 [PH] (ref 5–8)
RBC #/AREA URNS AUTO: ABNORMAL /HPF
SP GR UR STRIP: 1.01 (ref 1–1.03)
SQUAMOUS #/AREA URNS AUTO: ABNORMAL /LPF
UROBILINOGEN UR STRIP-ACNC: 0.2 E.U./DL
WBC #/AREA URNS AUTO: ABNORMAL /HPF
YEAST #/AREA URNS HPF: ABNORMAL /HPF

## 2025-03-10 ENCOUNTER — VIRTUAL VISIT (OUTPATIENT)
Dept: NEUROLOGY | Facility: CLINIC | Age: 55
End: 2025-03-10
Payer: COMMERCIAL

## 2025-03-10 DIAGNOSIS — F43.23 ADJUSTMENT DISORDER WITH MIXED ANXIETY AND DEPRESSED MOOD: Primary | ICD-10-CM

## 2025-03-10 PROCEDURE — 90834 PSYTX W PT 45 MINUTES: CPT | Mod: 95 | Performed by: PSYCHOLOGIST

## 2025-03-10 NOTE — PROGRESS NOTES
Psychology Progress Note    Date: March 10, 2025    Time length and type of treatment: 38 minutes (3:00 PM - 3:38 PM), individual therapy    Telemedicine visit:   The patient's condition can be safely assessed and treated via synchronous audio and visual telemedicine encounter via [employer]. Patient was informed that policies and procedures that govern in-person sessions would also apply to video sessions. Patient was in agreement with proceeding with a video session.     Patient Location: Patient home in [place], [place]  Provider Location:  [facility] - Provider remote location     Necessity: This session is necessary to address the patient's anxiety and depressed mood. Today we focus on the patient's treatment plan, specifically exploring coping strategies.  The reader is invited to review the patient's full treatment plan in the Media section of the patient's Epic medical record.    Psychotherapeutic Technique: This writer utilized motivational interviewing, active listening, reassurance and support in the context of cognitive behavioral therapy to address the above.      Mental Status Evaluation:  Grooming: Within normal limits  Attire: Appropriate  Age: Appears Stated  Behavior Towards Examiner: Cooperative  Motor Activity: Within normal limits  Eye Contact: Appropriate  Mood: Anxious   Affect: full range  Speech/Language: normal  Attention: Normal  Concentration: Sufficient  Thought Process: unremarkable  Thought Content: Clear    Orientation: Appeared oriented to person, place, and time, though not formally established  Memory: No evidence of impairment.  Judgement: Adequate  Estimated Intelligence: Within normal limits  Demonstrated Insight: Adequate  Fund of Knowledge: Adequate    Intervention:   Patient reported she has been struggling with increased anxiety secondary to anticipating she will shortly lose her job. She noted that her company is having significant financial  difficulties, has let go of many staff, and recent events have her strongly suspecting she will be leaving soon.  Patient acknowledged feeling substantial anxiety about losing her job, but also some hope that she will be laid off since she dislikes her job so much.  Patient was provided psychoeducation about anxiety and taught a simple breathing strategy. Session ended early due to patient receiving an unexpected Teams request for a video conference that she anticipates will be her termination meeting.  We briefly discussed anxiety management coping skills regardless of what the teleconference is about.     Progress:  Patient reported psychoeducation related to depression has been helpful and she has already noticed her mood improving with behavioral activation strategies.      Plan:   We will meet again in 2 weeks to address the patient's anxiety and depressed mood.  Estimated duration of treatment is 7-9 individual therapy sessions (32423) at 2-week intervals. Treatment is expected to be completed by May 2026.     Diagnosis:  Adjustment Disorder with Mixed Anxiety and Depressed Mood

## 2025-03-10 NOTE — LETTER
3/10/2025      Jesusita Henderson  [address]  [facility] [address]      Dear Colleague,    Thank you for referring your patient, Jesusita Henderson, to the [facility]. Please see a copy of my visit note below.    Psychology Progress Note    Date: March 10, 2025    Time length and type of treatment: 38 minutes (3:00 PM - 3:38 PM), individual therapy    Telemedicine visit:   The patient's condition can be safely assessed and treated via synchronous audio and visual telemedicine encounter via [employer]. Patient was informed that policies and procedures that govern in-person sessions would also apply to video sessions. Patient was in agreement with proceeding with a video session.     Patient Location: Patient home in [place], [place]  Provider Location:  [facility] - Provider remote location     Necessity: This session is necessary to address the patient's anxiety and depressed mood. Today we focus on the patient's treatment plan, specifically exploring coping strategies.  The reader is invited to review the patient's full treatment plan in the Media section of the patient's Epic medical record.    Psychotherapeutic Technique: This writer utilized motivational interviewing, active listening, reassurance and support in the context of cognitive behavioral therapy to address the above.      Mental Status Evaluation:  Grooming: Within normal limits  Attire: Appropriate  Age: Appears Stated  Behavior Towards Examiner: Cooperative  Motor Activity: Within normal limits  Eye Contact: Appropriate  Mood: Anxious   Affect: full range  Speech/Language: normal  Attention: Normal  Concentration: Sufficient  Thought Process: unremarkable  Thought Content: Clear    Orientation: Appeared oriented to person, place, and time, though not formally established  Memory: No evidence of impairment.  Judgement: Adequate  Estimated Intelligence: Within normal limits  Demonstrated  Insight: Adequate  Fund of Knowledge: Adequate    Intervention:   Patient reported she has been struggling with increased anxiety secondary to anticipating she will shortly lose her job. She noted that her company is having significant financial difficulties, has let go of many staff, and recent events have her strongly suspecting she will be leaving soon.  Patient acknowledged feeling substantial anxiety about losing her job, but also some hope that she will be laid off since she dislikes her job so much.  Patient was provided psychoeducation about anxiety and taught a simple breathing strategy. Session ended early due to patient receiving an unexpected Teams request for a video conference that she anticipates will be her termination meeting.  We briefly discussed anxiety management coping skills regardless of what the teleconference is about.     Progress:  Patient reported psychoeducation related to depression has been helpful and she has already noticed her mood improving with behavioral activation strategies.      Plan:   We will meet again in 2 weeks to address the patient's anxiety and depressed mood.  Estimated duration of treatment is 7-9 individual therapy sessions (89748) at 2-week intervals. Treatment is expected to be completed by May 2026.     Diagnosis:  Adjustment Disorder with Mixed Anxiety and Depressed Mood      Again, thank you for allowing me to participate in the care of your patient.        Sincerely,        Ml Cortés, PhD LP    Electronically signed

## 2025-03-17 DIAGNOSIS — G47.00 INSOMNIA, UNSPECIFIED TYPE: ICD-10-CM

## 2025-03-17 RX ORDER — ZOLPIDEM TARTRATE 5 MG/1
5 TABLET ORAL
Qty: 30 TABLET | Refills: 0 | Status: SHIPPED | OUTPATIENT
Start: 2025-03-17

## 2025-03-27 ENCOUNTER — VIRTUAL VISIT (OUTPATIENT)
Dept: NEUROLOGY | Facility: CLINIC | Age: 55
End: 2025-03-27
Payer: COMMERCIAL

## 2025-03-27 DIAGNOSIS — F43.23 ADJUSTMENT DISORDER WITH MIXED ANXIETY AND DEPRESSED MOOD: Primary | ICD-10-CM

## 2025-03-27 NOTE — PROGRESS NOTES
Psychology Progress Note    Date: March 27, 2025    Time length and type of treatment: 48 minutes (11:12 AM to 12:00 PM), individual therapy    Telemedicine visit: Patient visit was changed to a telephone visit due to the video connection being lost and the majority of the visit was completed with audio only. The patient's condition can be safely assessed and treated via synchronous audio and visual telemedicine encounter. Patient was informed that policies and procedures that govern in-person sessions would also apply to telephone sessions. Patient was in agreement with proceeding with a telephone session.     Patient Location: Patient home in [place], [place]  Provider Location:  [facility] - Provider remote location     Necessity: This session is necessary to address the patient's anxiety and depressed mood. Today we focus on the patient's treatment plan, specifically exploring thoughts and expectations of self and others.  The reader is invited to review the patient's full treatment plan in the Media section of the patient's Epic medical record.    Psychotherapeutic Technique: This writer utilized motivational interviewing, active listening, reassurance and support in the context of cognitive behavioral therapy to address the above.      Mental Status Evaluation:  Grooming: Unable to determine due to telephone visit  Attire: Unable to determine due to telephone visit  Age: Unable to determine due to telephone visit  Behavior Towards Examiner: Cooperative  Motor Activity: Unable to determine due to telephone visit  Eye Contact: Unable to determine due to telephone visit  Mood: Sad   Affect: blunted  Speech/Language: normal  Attention: Normal  Concentration: Sufficient  Thought Process: unremarkable  Thought Content: Clear    Orientation: Appeared oriented to person, place, and time, though not formally established  Memory: No evidence of impairment.  Judgement: Adequate  Estimated  Intelligence: Within normal limits  Demonstrated Insight: Adequate  Fund of Knowledge: Adequate    Intervention:   Patient reported she was terminated right after our last session.  Although this wasn't unexpected, patient acknowledged that it was still difficult to hear. Patient discussed her tendency to be a people pleaser, the challenge of dealing with both rejection and silence in response to job applications, and her anxiety about how age might impact her ability to find employment.  Patient also discussed a visit with her mom, frustration over a disagreement between the 2 of them, and her feelings of guilt but she is not the person her mom wants her to be.  We discussed patient right to be herself regardless of her mom's preferences and explored how guilt and irritation shape their relationship.    Progress:  Patient demonstrated increased insight into interpersonal dynamics    Plan:   We will meet again in 1 week to address the patient's anxiety and depressed mood.  Estimated duration of treatment is 7 - 9 individual therapy sessions (88358) at twice monthly intervals. Treatment is expected to be completed by May 2025.     Diagnosis:  Adjustment Disorder with Mixed Anxiety and Depressed Mood

## 2025-04-02 ENCOUNTER — VIRTUAL VISIT (OUTPATIENT)
Dept: NEUROLOGY | Facility: CLINIC | Age: 55
End: 2025-04-02
Payer: COMMERCIAL

## 2025-04-02 DIAGNOSIS — F43.23 ADJUSTMENT DISORDER WITH MIXED ANXIETY AND DEPRESSED MOOD: Primary | ICD-10-CM

## 2025-04-02 PROCEDURE — 90834 PSYTX W PT 45 MINUTES: CPT | Mod: 95 | Performed by: PSYCHOLOGIST

## 2025-04-02 NOTE — PROGRESS NOTES
Psychology Progress Note    Date: April 2, 2025    Time length and type of treatment: 47 minutes (1:05 PM to 1:52 PM), individual therapy    Telemedicine visit: The patient's condition can be safely assessed and treated via synchronous audio and visual telemedicine encounter via [employer]. Patient was informed that policies and procedures that govern in-person sessions would also apply to video sessions. Patient was in agreement with proceeding with a video session.     Patient Location: Patient home in [place], [place]  Provider Location:  [facility] - Provider remote location     Necessity: This session is necessary to address the patient's anxiety and depressed mood.   Today we focus on the patient's treatment plan, specifically exploring thoughts and expectations of self and others.  The reader is invited to review the patient's full treatment plan in the Media section of the patient's Epic medical record.    Psychotherapeutic Technique: This writer utilized motivational interviewing, active listening, reassurance and support in the context of cognitive behavioral therapy to address the above.      Mental Status Evaluation:  Grooming: Within normal limits  Attire: Appropriate  Age: Appears Stated  Behavior Towards Examiner: Cooperative  Motor Activity: Within normal limits  Eye Contact: Appropriate  Mood: Anxious  Depressed   Affect: full range  Speech/Language: normal  Attention: Normal  Concentration: Sufficient  Thought Process: unremarkable  Thought Content: Clear    Orientation: Appeared oriented to person, place, and time, though not formally established  Memory: No evidence of impairment.  Judgement: Adequate  Estimated Intelligence: Within normal limits  Demonstrated Insight: Adequate  Fund of Knowledge: Adequate    Intervention:   Patient discussed their frustration that an attempt to end negotiate severance from their employer went badly and opined that they are emotionality  interfered with their ability to be effective.  We discussed patient relationship with her emotions, and patient was provided psychoeducation related to logic mind, emotion mind, and wise mind.  Strategies for working with intense emotion were introduced.    Progress:  Patient demonstrated increased insight into intrapersonal dynamics    Plan:   We will meet again in 1 week to address the patient's anxiety and depressed mood.  Estimated duration of treatment is 7-9 individual therapy sessions ([id number]) at twice monthly intervals. Treatment is expected to be completed by May 2025.     Diagnosis:  Adjustment Disorder with Mixed Anxiety and Depressed Mood

## 2025-05-13 ENCOUNTER — OFFICE VISIT (OUTPATIENT)
Dept: NEUROLOGY | Facility: CLINIC | Age: 55
End: 2025-05-13
Payer: COMMERCIAL

## 2025-05-13 DIAGNOSIS — F43.23 ADJUSTMENT DISORDER WITH MIXED ANXIETY AND DEPRESSED MOOD: Primary | ICD-10-CM

## 2025-05-13 PROCEDURE — 90834 PSYTX W PT 45 MINUTES: CPT | Performed by: PSYCHOLOGIST

## 2025-05-13 ASSESSMENT — PATIENT HEALTH QUESTIONNAIRE - PHQ9
SUM OF ALL RESPONSES TO PHQ9 QUESTIONS 1 & 2: 2
5. POOR APPETITE OR OVEREATING: NEARLY EVERY DAY
SUM OF ALL RESPONSES TO PHQ QUESTIONS 1-9: 13
7. TROUBLE CONCENTRATING ON THINGS, SUCH AS READING THE NEWSPAPER OR WATCHING TELEVISION: NOT AT ALL
2. FEELING DOWN, DEPRESSED OR HOPELESS: SEVERAL DAYS
8. MOVING OR SPEAKING SO SLOWLY THAT OTHER PEOPLE COULD HAVE NOTICED. OR THE OPPOSITE, BEING SO FIGETY OR RESTLESS THAT YOU HAVE BEEN MOVING AROUND A LOT MORE THAN USUAL: SEVERAL DAYS
10. IF YOU CHECKED OFF ANY PROBLEMS, HOW DIFFICULT HAVE THESE PROBLEMS MADE IT FOR YOU TO DO YOUR WORK, TAKE CARE OF THINGS AT HOME, OR GET ALONG WITH OTHER PEOPLE: SOMEWHAT DIFFICULT
3. TROUBLE FALLING OR STAYING ASLEEP OR SLEEPING TOO MUCH: NEARLY EVERY DAY
1. LITTLE INTEREST OR PLEASURE IN DOING THINGS: SEVERAL DAYS
6. FEELING BAD ABOUT YOURSELF - OR THAT YOU ARE A FAILURE OR HAVE LET YOURSELF OR YOUR FAMILY DOWN: NEARLY EVERY DAY
9. THOUGHTS THAT YOU WOULD BE BETTER OFF DEAD, OR OF HURTING YOURSELF: NOT AT ALL
4. FEELING TIRED OR HAVING LITTLE ENERGY: SEVERAL DAYS

## 2025-05-13 ASSESSMENT — ANXIETY QUESTIONNAIRES
3. WORRYING TOO MUCH ABOUT DIFFERENT THINGS: NEARLY EVERY DAY
GAD7 TOTAL SCORE: 15
4. TROUBLE RELAXING: NEARLY EVERY DAY
6. BECOMING EASILY ANNOYED OR IRRITABLE: NEARLY EVERY DAY
7. FEELING AFRAID AS IF SOMETHING AWFUL MIGHT HAPPEN: MORE THAN HALF THE DAYS
5. BEING SO RESTLESS THAT IT IS HARD TO SIT STILL: NOT AT ALL
1. FEELING NERVOUS, ANXIOUS, OR ON EDGE: MORE THAN HALF THE DAYS
2. NOT BEING ABLE TO STOP OR CONTROL WORRYING: MORE THAN HALF THE DAYS
IF YOU CHECKED OFF ANY PROBLEMS ON THIS QUESTIONNAIRE, HOW DIFFICULT HAVE THESE PROBLEMS MADE IT FOR YOU TO DO YOUR WORK, TAKE CARE OF THINGS AT HOME, OR GET ALONG WITH OTHER PEOPLE: SOMEWHAT DIFFICULT

## 2025-05-13 NOTE — PROGRESS NOTES
Psychology Progress Note    Date: May 13, 2025    Time length and type of treatment: 52 minutes (3:03 PM to 3:55 PM), individual therapy    Location:  [facility]    Necessity: This session is necessary to address the patient's anxiety and depressed mood.   Today we focus on the patient's treatment plan, specifically exploring coping strategies.  The reader is invited to review the patient's full treatment plan in the Media section of the patient's Epic medical record.    Psychotherapeutic Technique: This writer utilized motivational interviewing, active listening, reassurance and support in the context of cognitive behavioral therapy to address the above.      Mental Status Evaluation:  Grooming: Within normal limits  Attire: Appropriate  Age: Appears Stated  Behavior Towards Examiner: Cooperative  Motor Activity: Within normal limits  Eye Contact: Appropriate  Mood: Anxious   Affect: full range  Speech/Language: normal  Attention: Normal  Concentration: Sufficient  Thought Process: unremarkable  Thought Content: Clear    Orientation: Appeared oriented to person, place, and time, though not formally established  Memory: No evidence of impairment.  Judgement: Adequate  Estimated Intelligence: Within normal limits  Demonstrated Insight: Adequate  Fund of Knowledge: Adequate    Intervention:   Patient reported she was successful in ending a new job, but her work schedule will not allow continued participation in individual therapy.  At patient request, she was provided psychoeducation related to a wide range of strategies for working with depression, anxiety, healthy eating, and sleep.  The patient was readministered the PHQ-9 and CARLINE-7.  Her score of 13 on the PHQ-9 is suggestive of moderate depression and has a substantial improvement over her earlier severe score of 19.  Her score of 15 on the CARLINE-7 is suggestive of moderate anxiety and is similar to her earlier score.  Patient  agreed this is accurate.     Progress:  Depression has improved and anxiety has remained stable.    Plan:   Patient's work schedule has changed and she is unable to currently participate in individual therapy.  Patient was reassured she can return in the future if her schedule allows.  No further follow-up is planned.    Diagnosis:  Adjustment disorder with mixed anxiety and depressed mood

## 2025-09-02 ENCOUNTER — MYC REFILL (OUTPATIENT)
Dept: FAMILY MEDICINE | Facility: CLINIC | Age: 55
End: 2025-09-02
Payer: COMMERCIAL

## 2025-09-02 DIAGNOSIS — G47.00 INSOMNIA, UNSPECIFIED TYPE: ICD-10-CM

## 2025-09-02 RX ORDER — ZOLPIDEM TARTRATE 5 MG/1
5 TABLET ORAL
Qty: 30 TABLET | Refills: 2 | Status: SHIPPED | OUTPATIENT
Start: 2025-09-02